# Patient Record
Sex: MALE | Race: WHITE | NOT HISPANIC OR LATINO | Employment: FULL TIME | URBAN - METROPOLITAN AREA
[De-identification: names, ages, dates, MRNs, and addresses within clinical notes are randomized per-mention and may not be internally consistent; named-entity substitution may affect disease eponyms.]

---

## 2018-04-11 ENCOUNTER — OFFICE VISIT (OUTPATIENT)
Dept: INTERNAL MEDICINE | Facility: MEDICAL CENTER | Age: 33
End: 2018-04-11
Payer: COMMERCIAL

## 2018-04-11 VITALS
HEIGHT: 72 IN | HEART RATE: 90 BPM | SYSTOLIC BLOOD PRESSURE: 123 MMHG | OXYGEN SATURATION: 97 % | DIASTOLIC BLOOD PRESSURE: 73 MMHG | TEMPERATURE: 98.4 F | BODY MASS INDEX: 24.95 KG/M2 | WEIGHT: 184.2 LBS

## 2018-04-11 DIAGNOSIS — R10.9 FLANK PAIN: ICD-10-CM

## 2018-04-11 PROBLEM — M54.9 BILATERAL BACK PAIN: Status: RESOLVED | Noted: 2018-04-11 | Resolved: 2018-04-11

## 2018-04-11 PROBLEM — M54.9 BILATERAL BACK PAIN: Status: ACTIVE | Noted: 2018-04-11

## 2018-04-11 PROCEDURE — 99204 OFFICE O/P NEW MOD 45 MIN: CPT | Mod: GC | Performed by: INTERNAL MEDICINE

## 2018-04-11 ASSESSMENT — PAIN SCALES - GENERAL: PAINLEVEL: 4=SLIGHT-MODERATE PAIN

## 2018-04-11 ASSESSMENT — PATIENT HEALTH QUESTIONNAIRE - PHQ9: CLINICAL INTERPRETATION OF PHQ2 SCORE: 0

## 2018-04-11 NOTE — PROGRESS NOTES
New Patient to Establish    Reason to establish: New patient to establish    CC: Back pain- 1 month      HPI:   This is a 32 yo male with no significant past medical hx who is here to establish car and complains of 1 month hx of bilateral flank pain.  Patient who is a  went to [place] to work, and stayed for 3 months. While away, as part of his job, he slept most of the times on the wooden boards in  his truck. He also went skiing. He initially thought these may be related to the activities he engaed in while was away.  But he noticed the pain is worse anytime he takes in alcohol. Pain is usually dull and 3/10 intensity at baseline, but can go to a 6 or 7/10 on alcohol ingestion. Pain is non radiating. He has no dysuria, frequency of urine, hematuria.  No hx of renal stones.  No hx of kidney diseases. He is worried he has kidney problems. No family hx of renal diseases  No hx of fevers, chills, nausea or vomiting. No trauma hx. Currently not on any medications.   Has never experience such pain before.     No other systemic symptoms.    ROS:  Constitutional: No fever, no chills,  Respiratory: No cough, no hemoptysis,  Cardiovascular: No chest pain, no palpitations, no orthopnea, no PND, no lower extremity swelling  GI  : No nausea, no vomiting, no diarrhea, no constipation, no hematochezia  : No dysuria, no urgency, no hesitancy, no nocturia, no frequency, no hematuria  Endocrine: No polyuria, no polydipsia,  Hematology: No easy bruisability, no bleeding  Musculoskeletal: No joint swelling, no joint redness,  Neuro: No seizures, no numbness, no tingling sensation, no extremity weakness, no change in vision,   Psychiatry: no depression    Patient Active Problem List    Diagnosis Date Noted   • Flank pain 04/11/2018       No past medical history on file.    Current Outpatient Prescriptions   Medication Sig Dispense Refill   • Multiple Vitamin (MULTI-VITAMIN DAILY PO) Take  by mouth.     • B Complex  Vitamins (VITAMIN B COMPLEX PO) Take  by mouth.       No current facility-administered medications for this visit.        Allergies as of 04/11/2018   • (No Known Allergies)       Social History     Social History   • Marital status: Single     Spouse name: N/A   • Number of children: N/A   • Years of education: N/A     Occupational History   • Not on file.     Social History Main Topics   • Smoking status: Never Smoker   • Smokeless tobacco: Former User     Types: Chew   • Alcohol use Yes      Comment: 20 drinks a week    • Drug use: Yes     Types: Marijuana      Comment: once a month    • Sexual activity: Yes     Partners: Female      Comment: no condoms     Other Topics Concern   • Not on file     Social History Narrative   • No narrative on file       Family History   Problem Relation Age of Onset   • No Known Problems Mother    • Other Father      gout   • No Known Problems Brother    • Other Maternal Grandmother      dementia   • Heart Disease Maternal Grandfather    • Other Maternal Grandfather      suicide   • Lung Disease Paternal Grandmother      emphesema   • Other Paternal Grandfather      parkinsons       No past surgical history on file.      /73   Pulse 90   Temp 36.9 °C (98.4 °F)   Ht 1.829 m (6')   Wt 83.6 kg (184 lb 3.2 oz)   SpO2 97%   BMI 24.98 kg/m²     Physical Exam  General:young male,  not in distress, not in pain   HEENT: Normocephalic, atraumatic, no jaundice or scleral icterus, no pallor, No cervical lymphadenopathy, no thyromegaly,  No tonsillar exudate, no throat erythyema,  PERLL, EOMI,  Respiratory:lungs clear to auscultation b/l, breath sounds vesicular, air entry adequate b/l,no wheezing, rales or crackles  Cardiac:Regular, rate and rhythm, S1/S2 present, no M/R/G  GI:Physical Exam   Musculoskeletal:        Arms:  Mild bilateral CVA tenderness on deep percussion   No anterior abdominal tenderness  Bowel sound present normal        Neuro: AAOX4, CN II-XII intact, sensation  intact, strength 5/5 in all extremities, Gait is normal,   no nystagmus  Psychiatry: Good judgment, good mood  Msk/Extremities: radial, dorsalis pedis pulses 2+b/l, no joint swelling or redness, ROM intact, no lower  extremity edema  Skin: No rash    Note: I have reviewed all pertinent labs and diagnostic tests associated with this visit with specific comments listed under the assessment and plan below    Assessment and Plan   34 yo male with no significant past medical hx who is here to establish car and complains of 1 month hx of bilateral flank pain.    1. Flank pain of unclear etiology  1 month hx of flank pain, pain dull, 3/10 intensity, but increased with alcohol consumption when pain increases to a 7/10.  Non radiation. No dysuria, freq, nocturia or hesitancy, no hematuria  Exam shows mild bilateral renal marylin tenderness on deep percussion.   Differentials are broad; Pain may be musculoskeletal in origin given hx of sleeping mostly on hard wood, and surfing in [place]. But since pain worsen with alcohol, this does not fit. Other cause could be nephrolithiasis but less likely because pain not typical.  Patient may also have pain that is related to stretching of the capsule or traction on the renal pedicle, in setting of renal cysts or other anatomic renal pathologies.  Taking alcohol as well is  likely to lead to excess diuresis affecting  the renal pelvis hence pain  Pain could be related to pancreatitis, given retroperitoneal nature of pancreas, hence back pain, but the locations-bilateral and CVA areas makes it less likely.  -will check UA to assess for hematuria  -will do CMP and CBC to assess renal function  -Check lipase to r/o possible pancreatitis  -we will do US abdomen to assess for anatomic renal pathologies  -patient advised to take tylenol for pain, and try avoiding NSAIDS for now till the cause ascertained w/ labs and US      Followup: Return in about 3 months (around 7/11/2018).    Risk  Assessment (discuss potential complications a function of chronic problems): YES    Complexity (discuss number of co-morbidities): 4    Signed by: Sammy Ibarra M.D.

## 2018-04-13 DIAGNOSIS — R10.9 FLANK PAIN: ICD-10-CM

## 2018-04-23 ENCOUNTER — HOSPITAL ENCOUNTER (OUTPATIENT)
Dept: RADIOLOGY | Facility: MEDICAL CENTER | Age: 33
End: 2018-04-23
Attending: STUDENT IN AN ORGANIZED HEALTH CARE EDUCATION/TRAINING PROGRAM
Payer: COMMERCIAL

## 2018-04-23 DIAGNOSIS — R10.9 FLANK PAIN: ICD-10-CM

## 2018-04-23 PROCEDURE — 76775 US EXAM ABDO BACK WALL LIM: CPT

## 2019-09-27 ENCOUNTER — OFFICE VISIT (OUTPATIENT)
Dept: URGENT CARE | Facility: CLINIC | Age: 34
End: 2019-09-27
Payer: OTHER MISCELLANEOUS

## 2024-03-25 ENCOUNTER — HOSPITAL ENCOUNTER (EMERGENCY)
Facility: MEDICAL CENTER | Age: 39
End: 2024-03-25
Attending: EMERGENCY MEDICINE
Payer: OTHER MISCELLANEOUS

## 2024-03-25 ENCOUNTER — APPOINTMENT (OUTPATIENT)
Dept: RADIOLOGY | Facility: MEDICAL CENTER | Age: 39
End: 2024-03-25
Attending: EMERGENCY MEDICINE
Payer: OTHER MISCELLANEOUS

## 2024-03-25 VITALS
HEIGHT: 72 IN | HEART RATE: 70 BPM | BODY MASS INDEX: 25.08 KG/M2 | WEIGHT: 185.19 LBS | TEMPERATURE: 97.8 F | SYSTOLIC BLOOD PRESSURE: 126 MMHG | DIASTOLIC BLOOD PRESSURE: 85 MMHG | OXYGEN SATURATION: 98 % | RESPIRATION RATE: 18 BRPM

## 2024-03-25 DIAGNOSIS — R07.9 CHEST PAIN, UNSPECIFIED TYPE: ICD-10-CM

## 2024-03-25 LAB
ALBUMIN SERPL BCP-MCNC: 4.7 G/DL (ref 3.2–4.9)
ALBUMIN/GLOB SERPL: 1.8 G/DL
ALP SERPL-CCNC: 47 U/L (ref 30–99)
ALT SERPL-CCNC: 12 U/L (ref 2–50)
ANION GAP SERPL CALC-SCNC: 11 MMOL/L (ref 7–16)
AST SERPL-CCNC: 18 U/L (ref 12–45)
BASOPHILS # BLD AUTO: 0.9 % (ref 0–1.8)
BASOPHILS # BLD: 0.05 K/UL (ref 0–0.12)
BILIRUB SERPL-MCNC: 0.4 MG/DL (ref 0.1–1.5)
BUN SERPL-MCNC: 12 MG/DL (ref 8–22)
CALCIUM ALBUM COR SERPL-MCNC: 8.7 MG/DL (ref 8.5–10.5)
CALCIUM SERPL-MCNC: 9.3 MG/DL (ref 8.5–10.5)
CHLORIDE SERPL-SCNC: 102 MMOL/L (ref 96–112)
CO2 SERPL-SCNC: 23 MMOL/L (ref 20–33)
CREAT SERPL-MCNC: 0.86 MG/DL (ref 0.5–1.4)
EKG IMPRESSION: NORMAL
EOSINOPHIL # BLD AUTO: 0.07 K/UL (ref 0–0.51)
EOSINOPHIL NFR BLD: 1.3 % (ref 0–6.9)
ERYTHROCYTE [DISTWIDTH] IN BLOOD BY AUTOMATED COUNT: 40.7 FL (ref 35.9–50)
GFR SERPLBLD CREATININE-BSD FMLA CKD-EPI: 111 ML/MIN/1.73 M 2
GLOBULIN SER CALC-MCNC: 2.6 G/DL (ref 1.9–3.5)
GLUCOSE SERPL-MCNC: 103 MG/DL (ref 65–99)
HCT VFR BLD AUTO: 45.5 % (ref 42–52)
HGB BLD-MCNC: 16.2 G/DL (ref 14–18)
IMM GRANULOCYTES # BLD AUTO: 0.02 K/UL (ref 0–0.11)
IMM GRANULOCYTES NFR BLD AUTO: 0.4 % (ref 0–0.9)
LYMPHOCYTES # BLD AUTO: 1.82 K/UL (ref 1–4.8)
LYMPHOCYTES NFR BLD: 32.7 % (ref 22–41)
MCH RBC QN AUTO: 30.3 PG (ref 27–33)
MCHC RBC AUTO-ENTMCNC: 35.6 G/DL (ref 32.3–36.5)
MCV RBC AUTO: 85.2 FL (ref 81.4–97.8)
MONOCYTES # BLD AUTO: 0.47 K/UL (ref 0–0.85)
MONOCYTES NFR BLD AUTO: 8.5 % (ref 0–13.4)
NEUTROPHILS # BLD AUTO: 3.13 K/UL (ref 1.82–7.42)
NEUTROPHILS NFR BLD: 56.2 % (ref 44–72)
NRBC # BLD AUTO: 0 K/UL
NRBC BLD-RTO: 0 /100 WBC (ref 0–0.2)
PLATELET # BLD AUTO: 192 K/UL (ref 164–446)
PMV BLD AUTO: 10.8 FL (ref 9–12.9)
POTASSIUM SERPL-SCNC: 4.6 MMOL/L (ref 3.6–5.5)
PROT SERPL-MCNC: 7.3 G/DL (ref 6–8.2)
RBC # BLD AUTO: 5.34 M/UL (ref 4.7–6.1)
SODIUM SERPL-SCNC: 136 MMOL/L (ref 135–145)
TROPONIN T SERPL-MCNC: <6 NG/L (ref 6–19)
TROPONIN T SERPL-MCNC: <6 NG/L (ref 6–19)
WBC # BLD AUTO: 5.6 K/UL (ref 4.8–10.8)

## 2024-03-25 PROCEDURE — 36415 COLL VENOUS BLD VENIPUNCTURE: CPT

## 2024-03-25 PROCEDURE — 93005 ELECTROCARDIOGRAM TRACING: CPT

## 2024-03-25 PROCEDURE — 71045 X-RAY EXAM CHEST 1 VIEW: CPT

## 2024-03-25 PROCEDURE — 80053 COMPREHEN METABOLIC PANEL: CPT

## 2024-03-25 PROCEDURE — 84484 ASSAY OF TROPONIN QUANT: CPT

## 2024-03-25 PROCEDURE — 85025 COMPLETE CBC W/AUTO DIFF WBC: CPT

## 2024-03-25 PROCEDURE — 99284 EMERGENCY DEPT VISIT MOD MDM: CPT

## 2024-03-25 PROCEDURE — 93005 ELECTROCARDIOGRAM TRACING: CPT | Performed by: EMERGENCY MEDICINE

## 2024-03-25 ASSESSMENT — HEART SCORE
HISTORY: SLIGHTLY SUSPICIOUS
AGE: <45
RISK FACTORS: NO KNOWN RISK FACTORS
TROPONIN: LESS THAN OR EQUAL TO NORMAL LIMIT
ECG: NORMAL
HEART SCORE: 0

## 2024-03-25 ASSESSMENT — PAIN DESCRIPTION - PAIN TYPE: TYPE: ACUTE PAIN

## 2024-03-25 NOTE — ED TRIAGE NOTES
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     Chest pain protocols ordered. Pt brought to [facility] office for blood draw. Pt educated of triage process and informed to contact staff if situation changes.    /80   Pulse 60   Temp 36.4 °C (97.5 °F) (Temporal)   Resp 14   Ht 1.829 m (6')   Wt 84 kg (185 lb 3 oz)   SpO2 100%   BMI 25.12 kg/m²

## 2024-03-25 NOTE — ED PROVIDER NOTES
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 male who presents to the Emergency Department for evaluation of chest pain onset 8 AM this morning. The patient reports that this morning he began to have sharp left sided chest pain that radiated to his neck and shoulder. He describes the pain as slowly building for a period of 15 minutes and then lasting for a period of about 30 minutes. Currently in the ED he is expressing a dull ache with chest tightness. He notes that he exercises regularly and does not usually experience chest pain. He adds that he had a similar episode of chest pain 2 years ago and he was diagnosed with \"precordial catch syndrome.\" Denies fever, chills, or recent illness. Denies history of hypertension or diabetes. Denies familial history of heart attacks or heart disease.     OUTSIDE HISTORIAN(S):  Select: None    EXTERNAL RECORDS REVIEWED  Select: The patient was seen by primary care 3/18/24 and his progress note reveals regular heart rate and rhythm. He had a umbilical hernia repair surgery 8/4/22.     PAST MEDICAL HISTORY  History reviewed. No pertinent past medical history.  .    SURGICAL HISTORY  History reviewed. No pertinent surgical history.      FAMILY HISTORY  Family History   Problem Relation Age of Onset    No Known Problems Mother     Other Father         gout    No Known Problems Brother     Other Maternal Grandmother         dementia    Heart Disease Maternal Grandfather     Other Maternal Grandfather         suicide    Lung Disease Paternal Grandmother         emphesema    Other Paternal Grandfather         parkinsons          SOCIAL HISTORY  Social History     Tobacco Use    Smoking status: Never    Smokeless tobacco: Former     Types: Chew   Vaping " Use    Vaping Use: Never used   Substance Use Topics    Alcohol use: Yes     Comment: 10-15 drinks a week    Drug use: Not Currently     Types: Marijuana     Comment: once a month          CURRENT MEDICATIONS  No current facility-administered medications on file prior to encounter.     Current Outpatient Medications on File Prior to Encounter   Medication Sig Dispense Refill    Multiple Vitamin (MULTI-VITAMIN DAILY PO) Take  by mouth.      B Complex Vitamins (VITAMIN B COMPLEX PO) Take  by mouth.           ALLERGIES  No Known Allergies    PHYSICAL EXAM  VITAL SIGNS:/80   Pulse 60   Temp 36.4 °C (97.5 °F) (Temporal)   Resp 14   Ht 1.829 m (6')   Wt 84 kg (185 lb 3 oz)   SpO2 100%   BMI 25.12 kg/m²       Constitutional: Well-developed no acute distress   HENT: Normocephalic, Atraumatic, Bilateral external ears normal.  Eyes:  conjunctiva are normal.   Neck: Supple.  Nontender midline  Cardiovascular: Regular rate and rhythm without murmurs gallops or rubs.   Thorax & Lungs: No respiratory distress. Breathing comfortably. Lungs are clear to auscultation bilaterally, there are no wheezes no rales. Chest wall is nontender.  Abdomen: Soft, non distended, non tender   Skin: Warm, Dry, No erythema,   Back: No tenderness, No CVA tenderness.  Musculoskeletal: No clubbing cyanosis or edema good range of motion   Neurologic: Alert & oriented x 3, normal sensation moving all extremities appears normal   Psychiatric: Affect normal, Judgment normal, Mood normal.     DIAGNOSTIC STUDIES / PROCEDURES    LABS  Results for orders placed or performed during the hospital encounter of 03/25/24   CBC with Differential   Result Value Ref Range    WBC 5.6 4.8 - 10.8 K/uL    RBC 5.34 4.70 - 6.10 M/uL    Hemoglobin 16.2 14.0 - 18.0 g/dL    Hematocrit 45.5 42.0 - 52.0 %    MCV 85.2 81.4 - 97.8 fL    MCH 30.3 27.0 - 33.0 pg    MCHC 35.6 32.3 - 36.5 g/dL    RDW 40.7 35.9 - 50.0 fL    Platelet Count 192 164 - 446 K/uL    MPV 10.8 9.0  - 12.9 fL    Neutrophils-Polys 56.20 44.00 - 72.00 %    Lymphocytes 32.70 22.00 - 41.00 %    Monocytes 8.50 0.00 - 13.40 %    Eosinophils 1.30 0.00 - 6.90 %    Basophils 0.90 0.00 - 1.80 %    Immature Granulocytes 0.40 0.00 - 0.90 %    Nucleated RBC 0.00 0.00 - 0.20 /100 WBC    Neutrophils (Absolute) 3.13 1.82 - 7.42 K/uL    Lymphs (Absolute) 1.82 1.00 - 4.80 K/uL    Monos (Absolute) 0.47 0.00 - 0.85 K/uL    Eos (Absolute) 0.07 0.00 - 0.51 K/uL    Baso (Absolute) 0.05 0.00 - 0.12 K/uL    Immature Granulocytes (abs) 0.02 0.00 - 0.11 K/uL    NRBC (Absolute) 0.00 K/uL   Complete Metabolic Panel (CMP)   Result Value Ref Range    Sodium 136 135 - 145 mmol/L    Potassium 4.6 3.6 - 5.5 mmol/L    Chloride 102 96 - 112 mmol/L    Co2 23 20 - 33 mmol/L    Anion Gap 11.0 7.0 - 16.0    Glucose 103 (H) 65 - 99 mg/dL    Bun 12 8 - 22 mg/dL    Creatinine 0.86 0.50 - 1.40 mg/dL    Calcium 9.3 8.5 - 10.5 mg/dL    Correct Calcium 8.7 8.5 - 10.5 mg/dL    AST(SGOT) 18 12 - 45 U/L    ALT(SGPT) 12 2 - 50 U/L    Alkaline Phosphatase 47 30 - 99 U/L    Total Bilirubin 0.4 0.1 - 1.5 mg/dL    Albumin 4.7 3.2 - 4.9 g/dL    Total Protein 7.3 6.0 - 8.2 g/dL    Globulin 2.6 1.9 - 3.5 g/dL    A-G Ratio 1.8 g/dL   Troponins NOW   Result Value Ref Range    Troponin T <6 6 - 19 ng/L   ESTIMATED GFR   Result Value Ref Range    GFR (CKD-EPI) 111 >60 mL/min/1.73 m 2   TROPONIN   Result Value Ref Range    Troponin T <6 6 - 19 ng/L   EKG   Result Value Ref Range    Report       [facility] Emergency Dept.    Test Date:  2024  Pt Name:    GENEVA NELSON             Department: ER  MRN:        [id number]                      Room:  Gender:     Male                         Technician: [id number]  :        1985                   Requested By:ER TRIAGE PROTOCOL  Order #:    [id number]                    Reading MD: MIRIAN PARISI MD    Measurements  Intervals                                Axis  Rate:       56                            P:          38  TN:         109                          QRS:        53  QRSD:       112                          T:          31  QT:         411  QTc:        397    Interpretive Statements  Sinus bradycardia  Short TN interval  Borderline intraventricular conduction delay  No previous ECG available for comparison  Electronically Signed On 03- 10:36:44 PDT by MIRIAN PARISI MD         EKG:   I have independently interpreted this EKG as detailed above.    RADIOLOGY  I have independently interpreted the diagnostic imaging associated with this visit and am waiting the final reading from the radiologist.   My preliminary interpretation is as follows: No significant intrapulmonary abnormalities    Radiologist interpretation:   DX-CHEST-PORTABLE (1 VIEW)   Final Result      No acute cardiac or pulmonary abnormalities are identified.           COURSE & MEDICAL DECISION MAKING    ED COURSE:    ED Observation Status? No, The patient does not qualify for observation status    INTERVENTIONS BY ME:  Medications - No data to display    10:36 AM - Patient seen and examined at bedside. Discussed plan of care, including labs and imaging. Patient agrees to the plan of care. Ordered for DX- Chest, CBC with diff, CMP, Troponin now and in 2 hours, and EKG to evaluate his symptoms. I explained that his risk factors for a heart attack are very low. The patient states that he needs to leave for an important business meeting in about an hour. He confirms that he will wait for the second blood draw to check his troponin before he leaves. Discussed plan for discharge, including plan for follow-up, and informed them to return to the [facility] ED with any new or worsening symptoms. Patient was given the opportunity for questions, and I addressed all questions or concerns. He is stable for discharge at this time. Patient verbalizes understanding and support with my plan for discharge.     INITIAL ASSESSMENT, COURSE AND PLAN  Care  Narrative: The patient is a 39 year old male who presents to the ED for left sided chest pain. Currently in the ED he is not experiencing chest pain. His EKG, labs, and chest xray appear normal and do not show evidence of a heart attack. He has no risk factors for MI or family history.  Patient's heart score is 0.  EKG is normal.  Troponins x 2 are normal.  At this point it could very well be a nonspecific chest wall pain.  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  At this point I recommended for the patient to follow-up with the primary care physician or cardiology for outpatient evaluation.  As long as the stress test and evaluations completely normal most likely it is a benign entity causing his chest discomfort.  However the patient is starting to get slightly older and it is reasonable to have it risk stratified.     ADDITIONAL PROBLEM LIST  Patient presents emerged part for evaluation.  The patient's heart score is    DISPOSITION AND DISCUSSIONS  I have discussed management of the patient with the following physicians/ KALA's/ ancillary staff:  None    Escalation of care considered, and ultimately not performed:acute inpatient care management, however at this time, the patient is most appropriate for outpatient management    Barriers to care at this time, including but not limited to:  None .     Decision tools and prescription drugs considered including, but not limited to: HEART Score 0 .    The patient will return for new or worsening symptoms and is stable at the time of discharge.    The patient is referred to a primary physician for blood pressure management, diabetic screening, and for all other preventative health concerns.    DISPOSITION:  Patient will be discharged home in stable condition.    FOLLOW UP:  [facility]  [address] [facility], [address] [address] [phone]  Schedule an appointment  as soon as possible for a visit   For further evaluation, Return if any symptoms worsen      OUTPATIENT MEDICATIONS:  Discharge Medication List as of 3/25/2024 11:06 AM           FINAL DIAGNOSIS  1. Chest pain, unspecified type         I, Stephanie Croft (Lay), am scribing for, and in the presence of, Jay Preciado M.D..    Electronically signed by: Stephanie Croft (Lay), 3/25/2024    IJay M.D. personally performed the services described in this documentation, as scribed by Stephanie Croft in my presence, and it is both accurate and complete.     Electronically signed by: Jay Preciado M.D.,4:07 PM 03/25/24

## 2024-04-19 ENCOUNTER — TELEPHONE (OUTPATIENT)
Dept: HEALTH INFORMATION MANAGEMENT | Facility: OTHER | Age: 39
End: 2024-04-19
Payer: OTHER MISCELLANEOUS

## 2025-04-23 ENCOUNTER — APPOINTMENT (OUTPATIENT)
Dept: RADIOLOGY | Facility: MEDICAL CENTER | Age: 40
End: 2025-04-23
Attending: EMERGENCY MEDICINE
Payer: COMMERCIAL

## 2025-04-23 ENCOUNTER — HOSPITAL ENCOUNTER (EMERGENCY)
Facility: MEDICAL CENTER | Age: 40
End: 2025-04-23
Attending: EMERGENCY MEDICINE
Payer: COMMERCIAL

## 2025-04-23 VITALS
TEMPERATURE: 97.5 F | OXYGEN SATURATION: 100 % | HEART RATE: 81 BPM | HEIGHT: 67 IN | WEIGHT: 185 LBS | DIASTOLIC BLOOD PRESSURE: 78 MMHG | RESPIRATION RATE: 14 BRPM | BODY MASS INDEX: 29.03 KG/M2 | SYSTOLIC BLOOD PRESSURE: 122 MMHG

## 2025-04-23 DIAGNOSIS — R46.89 UNCOOPERATIVE BEHAVIOR: ICD-10-CM

## 2025-04-23 PROCEDURE — 99283 EMERGENCY DEPT VISIT LOW MDM: CPT

## 2025-04-23 ASSESSMENT — FIBROSIS 4 INDEX: FIB4 SCORE: 1.136658342467075724

## 2025-04-23 ASSESSMENT — PAIN DESCRIPTION - PAIN TYPE: TYPE: ACUTE PAIN

## 2025-04-23 NOTE — ED NOTES
Patient discharged home per ERP. Discharge teaching, education, and POC discussed with patient who verbalized understanding. Patient instructed to return to the ER if symptoms worsen. No other questions at this time.    Vitals are stable. Patient alert and oriented.  Pt ambulated with walker off unit safely with security.

## 2025-04-23 NOTE — ED NOTES
Bedside report received from KYLE Hoff.  POC discussed with patient. Call light within reach, all needs addressed at this time.     Fall risk interventions in place: Move the patient closer to the nurse's station, Patient's personal possessions are with in their safe reach, Place fall risk sign on patient's door, and Give patient urinal if applicable (all applicable per [facility] Fall risk assessment)   Continuous monitoring: Cardiac Leads, Pulse Ox, or Blood Pressure  IVF/IV medications: Not Applicable   Oxygen: Room Air  Bedside sitter: Not Applicable   Isolation: Not Applicable

## 2025-04-23 NOTE — ED PROVIDER NOTES
"ED Provider Note    CHIEF COMPLAINT  Chief Complaint   Patient presents with    Testicle Pain     Patient BIBA for testicle pain. Patient found lying down on [facility] for this complaint. No trauma noted or reported by EMS. EMS reported finding several hypodermic needles in his possession. Patient denies any other complaints. EMS reported patient to have elevated heart rate while in their care at 150 bpm. Patient noted to present with sinus bradycardia on arrival. Denies chest pain/nausea/difficulty breathing. EMS reports FSBG of 117 mg/dL.        EXTERNAL RECORDS REVIEWED  Other none    HPI/ROS  LIMITATION TO HISTORY   Select: Behavior    OUTSIDE HISTORIAN(S):  EMS per report as below    Rodríguez Mccann is a 40 y.o. male who presents for evaluation of testicle pain, per report.    Here, the patient is lying on the stretcher with his eyes closed and refuses to answer questions but denies being somnolent, yelling \"I am not sleepy.\"     PAST MEDICAL HISTORY     Unknown    SURGICAL HISTORY  patient denies any surgical history    FAMILY HISTORY  Family History   Problem Relation Age of Onset    No Known Problems Mother     Other Father         gout    No Known Problems Brother     Other Maternal Grandmother         dementia    Heart Disease Maternal Grandfather     Other Maternal Grandfather         suicide    Lung Disease Paternal Grandmother         emphesema    Other Paternal Grandfather         parkinsons       SOCIAL HISTORY  Social History     Tobacco Use    Smoking status: Never    Smokeless tobacco: Former     Types: Chew   Vaping Use    Vaping status: Never Used   Substance and Sexual Activity    Alcohol use: Yes     Comment: 10-15 drinks a week    Drug use: Not Currently     Types: Marijuana     Comment: once a month     Sexual activity: Yes     Partners: Female     Comment: no condoms       CURRENT MEDICATIONS  Home Medications    **Home medications have not yet been reviewed for this " "encounter**         ALLERGIES  No Known Allergies    PHYSICAL EXAM  VITAL SIGNS: /78   Pulse 81   Temp 36.4 °C (97.5 °F) (Temporal)   Resp 14   Ht 1.702 m (5' 7\")   Wt 83.9 kg (185 lb)   SpO2 100%   BMI 28.98 kg/m²    General:  WDWN male, nontoxic, lying down on stretcher with eyes closed, arousable and agitated, disheveled, alert  Skin: warm and dry; good color; no rash  HEENT: NCAT; EOMs intact; PERRL; no scleral icterus   Neck: Soft  Extremities: KUMAR x 4  Neurologic: CNs III-XII grossly intact; speech clear  Psychiatric: Appropriate affect, agitated mood      EKG/LABS  None    RADIOLOGY/PROCEDURES   Pt arrived complaining of testicular pain.  When I went in to evaluate him and to have him prepare for the scrotal ultrasound that I ordered based on his chief complaint, he would not open his eyes initially or get undressed.  He then started yelling and denied having testicular pain.  He said he had knee pain instead.  Patient was asked to remove his pants and was noncompliant in doing so.  He is moving all extremities.  I do not suspect a fracture.  Patient will be discharged with security.      COURSE & MEDICAL DECISION MAKING    ASSESSMENT, COURSE AND PLAN  Care Narrative: This is a 40-year-old male who arrived by ambulance for evaluation of testicular pain.  He was found lying down on the ground.  Patient is aggressive and uncooperative here.  He initially ignored my requests for information and then yelled and swore at staff when asked to get undressed for physical exam.  Patient was alert and moving all extremities here.  Patient will be discharged with return precautions.      DISPOSITION AND DISCUSSIONS  I have discussed management of the patient with the following physicians and KALA's: None    Discussion of management with other QHP or appropriate source(s): Security     Escalation of care considered, and ultimately not performed:Laboratory analysis and diagnostic imaging    Barriers to care at " this time, including but not limited to:  Patient's behavior .     FINAL DIAGNOSIS  1. Uncooperative behavior         Electronically signed by: Marilu Choi M.D., 4/23/2025 6:55 AM

## 2025-04-23 NOTE — ED TRIAGE NOTES
"Chief Complaint   Patient presents with    Testicle Pain     Patient BIBA for testicle pain. Patient found lying down on [facility] for this complaint. No trauma noted or reported by EMS. EMS reported finding several hypodermic needles in his possession. Patient denies any other complaints. EMS reported patient to have elevated heart rate while in their care at 150 bpm. Patient noted to present with sinus bradycardia on arrival. Denies chest pain/nausea/difficulty breathing. EMS reports FSBG of 117 mg/dL.      /78   Pulse (!) 59   Temp 36.3 °C (97.4 °F) (Temporal)   Ht 1.702 m (5' 7\")   Wt 83.9 kg (185 lb)   SpO2 100%   BMI 28.98 kg/m²     "